# Patient Record
Sex: MALE | Race: WHITE | HISPANIC OR LATINO | Employment: FULL TIME | URBAN - METROPOLITAN AREA
[De-identification: names, ages, dates, MRNs, and addresses within clinical notes are randomized per-mention and may not be internally consistent; named-entity substitution may affect disease eponyms.]

---

## 2018-10-24 ENCOUNTER — HOSPITAL ENCOUNTER (EMERGENCY)
Facility: HOSPITAL | Age: 43
Discharge: HOME | End: 2018-10-24
Attending: EMERGENCY MEDICINE
Payer: COMMERCIAL

## 2018-10-24 VITALS
BODY MASS INDEX: 32.9 KG/M2 | WEIGHT: 235 LBS | OXYGEN SATURATION: 96 % | RESPIRATION RATE: 18 BRPM | HEART RATE: 94 BPM | HEIGHT: 71 IN | SYSTOLIC BLOOD PRESSURE: 135 MMHG | TEMPERATURE: 98 F | DIASTOLIC BLOOD PRESSURE: 64 MMHG

## 2018-10-24 DIAGNOSIS — S86.001A INJURY OF RIGHT ACHILLES TENDON, INITIAL ENCOUNTER: Primary | ICD-10-CM

## 2018-10-24 DIAGNOSIS — M79.673 FOOT PAIN: ICD-10-CM

## 2018-10-24 PROCEDURE — 29515 APPLICATION SHORT LEG SPLINT: CPT | Mod: RT

## 2018-10-24 PROCEDURE — 63600175 PHARM REV CODE 636 W HCPCS: Performed by: NURSE PRACTITIONER

## 2018-10-24 PROCEDURE — 96372 THER/PROPH/DIAG INJ SC/IM: CPT | Mod: 59

## 2018-10-24 PROCEDURE — 99284 EMERGENCY DEPT VISIT MOD MDM: CPT | Mod: 25

## 2018-10-24 RX ORDER — MORPHINE SULFATE 10 MG/ML
10 INJECTION INTRAMUSCULAR; INTRAVENOUS; SUBCUTANEOUS
Status: COMPLETED | OUTPATIENT
Start: 2018-10-24 | End: 2018-10-24

## 2018-10-24 RX ORDER — HYDROCODONE BITARTRATE AND ACETAMINOPHEN 5; 325 MG/1; MG/1
1 TABLET ORAL EVERY 4 HOURS PRN
Qty: 12 TABLET | Refills: 0 | Status: ON HOLD | OUTPATIENT
Start: 2018-10-24 | End: 2018-10-26 | Stop reason: HOSPADM

## 2018-10-24 RX ADMIN — MORPHINE SULFATE 10 MG: 10 INJECTION INTRAVENOUS at 09:10

## 2018-10-25 ENCOUNTER — ANESTHESIA EVENT (OUTPATIENT)
Dept: SURGERY | Facility: HOSPITAL | Age: 43
End: 2018-10-25
Payer: COMMERCIAL

## 2018-10-25 ENCOUNTER — OFFICE VISIT (OUTPATIENT)
Dept: ORTHOPEDICS | Facility: CLINIC | Age: 43
End: 2018-10-25
Payer: COMMERCIAL

## 2018-10-25 VITALS
HEART RATE: 92 BPM | BODY MASS INDEX: 32.9 KG/M2 | HEIGHT: 71 IN | DIASTOLIC BLOOD PRESSURE: 77 MMHG | SYSTOLIC BLOOD PRESSURE: 137 MMHG | WEIGHT: 235 LBS

## 2018-10-25 DIAGNOSIS — M92.61 HAGLUND'S DEFORMITY OF RIGHT HEEL: ICD-10-CM

## 2018-10-25 DIAGNOSIS — S86.011A RUPTURE OF RIGHT ACHILLES TENDON, INITIAL ENCOUNTER: Primary | ICD-10-CM

## 2018-10-25 PROCEDURE — 99999 PR PBB SHADOW E&M-EST. PATIENT-LVL III: CPT | Mod: PBBFAC,,, | Performed by: ORTHOPAEDIC SURGERY

## 2018-10-25 PROCEDURE — 97760 ORTHOTIC MGMT&TRAING 1ST ENC: CPT | Mod: GP,S$GLB,, | Performed by: ORTHOPAEDIC SURGERY

## 2018-10-25 PROCEDURE — 99204 OFFICE O/P NEW MOD 45 MIN: CPT | Mod: 25,S$GLB,, | Performed by: ORTHOPAEDIC SURGERY

## 2018-10-25 RX ORDER — MUPIROCIN 20 MG/G
OINTMENT TOPICAL
Status: CANCELLED | OUTPATIENT
Start: 2018-10-25

## 2018-10-25 RX ORDER — SODIUM CHLORIDE 9 MG/ML
INJECTION, SOLUTION INTRAVENOUS CONTINUOUS
Status: CANCELLED | OUTPATIENT
Start: 2018-10-25

## 2018-10-25 NOTE — ED NOTES
"States that when he was jumping up to shoot basketball he felt something "rip" in his heel, states pain into calf and lateral outer side of right foot. Ice pack applied. Family at bedside aware to notify nurse of needs or concerns  "

## 2018-10-25 NOTE — PROGRESS NOTES
"HPI: Fred Richmond is a 43 y.o. male who complains of right ankle pain. He says the pain began when he was playing basketball yesterday 10/25/18 and he jumped up and then landed and felt a pull in the back of the heel. He rates his pain as 4/10 today. He was seen in the ER and placed in a splint.   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      PAST MEDICAL/SURGICAL/FAMILY/SOCIAL/ HISTORY: REVIEWED    ALLERGIES/MEDICATIONS: REVIEWED       Review of Systems:     Constitution: Negative.   HEENT: Negative.   Eyes: Negative.   Cardiovascular: Negative.   Respiratory: Negative.   Endocrine: Negative.   Hematologic/Lymphatic: Negative.   Skin: Negative.   Musculoskeletal: Positive for right ankle pain   Gastrointestinal: Negative.   Genitourinary: Negative.   Neurological: Negative.   Psychiatric/Behavioral: Negative.   Allergic/Immunologic: Negative.       PHYSICAL EXAM:  Vitals:    10/25/18 1014   BP: 137/77   Pulse: 92     Ht Readings from Last 1 Encounters:   10/25/18 5' 11" (1.803 m)     Wt Readings from Last 1 Encounters:   10/25/18 106.6 kg (235 lb)         GENERAL: Well developed, well nourished, no acute distress.  Very pleasant.   SKIN: Skin is intact. No atrophy, abrasions or lesions are noted.   Neurological: Normal mental status. Appropriate and conversant. Alert and oriented x 3.  GAIT: Walks with crutches.     Right lower extremity compared with LLE:  2+ dorsalis pedis pulse.  Capillary refill < 3 seconds.  Decreased range of motion tibiotalar and subtalar joints due to pain and swelling. Normal alignment of the forefoot and the hindfoot.  5/5 strength EHL, FHL, tibialis anterior, 0/5 gastrocsoleus, 5/5 tibialis posterior and peroneals. Sensation to light touch intact sural, saphenous, superficial peroneal and deep peroneal nerves. moderate swelling and mild ecchymosis of the ankle with palpable defect in the distal achilles tendon which is very tender to palpation.  +Candelaria's test.  No " lymphadenopathy, no masses or tumors palpated.      XRAYS:   3 views of right ankle  reviewed today reveal spurring at the insertion of the achilles as well as Chaz's deformity      ASSESSMENT:        Encounter Diagnoses   Name Primary?    Rupture of right Achilles tendon, initial encounter Yes    Chaz's deformity of right heel         PLAN:  I spent 25 minutes in consulation with the patient today. More than half the time was spent counseling the patient on his condition and the options for operative versus non-operative care.  I recommend Right achilles repair. I highly suspect that this is an insertional rupture therefore I also recommend chaz's excision and removal of bone spurs with possible gastroc recession. I have explained the procedure, including indications, risks, and alternatives.  Fred Richmond gave informed consent and is medically ready for surgery. To OR tomorrow 10/26/18.    We performed a custom orthotic/brace adjustment, fitting and training with the patient today. The patient demonstrated understanding and proper care. This was performed for 15 minutes.  Short boot  was given.  Toe touch weightbearing until surgery.

## 2018-10-25 NOTE — ED PROVIDER NOTES
"Encounter Date: 10/24/2018       History     Chief Complaint   Patient presents with    Foot Injury     right foot pain, was jumping and felt foot pull     Fred Richmond is a 43 year old male presenting to the ED with c/o right foot pain. The patient states that he was playing basketball and went to jump up. When he lifted up onto his toes, he felt a pulling sensation to the right heel area. He had pain to the heel when he landed and felt like "something was underneath my heel and I couldn't put my foot down". He has taken no medication for pain. He denies numbness/tingling to the foot at this time.           Review of patient's allergies indicates:  No Known Allergies  No past medical history on file.  No past surgical history on file.  No family history on file.  Social History     Tobacco Use    Smoking status: Not on file   Substance Use Topics    Alcohol use: Not on file    Drug use: Not on file     Review of Systems   Constitutional: Negative for chills and fever.   HENT: Negative for congestion, rhinorrhea and sore throat.    Eyes: Negative for pain and redness.   Respiratory: Negative for cough and shortness of breath.    Cardiovascular: Negative for chest pain and palpitations.   Gastrointestinal: Negative for abdominal pain, diarrhea and nausea.   Genitourinary: Negative for dysuria, flank pain, frequency, hematuria and urgency.   Musculoskeletal: Positive for arthralgias (right foot/heel). Negative for gait problem, myalgias and neck pain.   Skin: Negative for rash.   Neurological: Negative for dizziness, light-headedness and headaches.       Physical Exam     Initial Vitals [10/24/18 2023]   BP Pulse Resp Temp SpO2   135/64 (!) 111 18 98.2 °F (36.8 °C) 96 %      MAP       --         Physical Exam    Constitutional: He appears well-developed and well-nourished. He is not diaphoretic. He is active. He does not have a sickly appearance. No distress.   HENT:   Head: Normocephalic and atraumatic.   Eyes: " Conjunctivae are normal.   Neck: Normal range of motion and full passive range of motion without pain.   Cardiovascular: Normal rate, regular rhythm and normal heart sounds. Exam reveals no gallop and no friction rub.    No murmur heard.  Pulmonary/Chest: Breath sounds normal. He has no wheezes. He has no rhonchi. He has no rales.   Abdominal: Soft. Bowel sounds are normal. There is no tenderness.   Musculoskeletal: Normal range of motion.        Right ankle: He exhibits normal range of motion and normal pulse. Achilles tendon exhibits pain and abnormal Candelaria's test results.        Right foot: There is tenderness and swelling. There is normal range of motion and normal capillary refill.        Feet:    Neurological: He is alert and oriented to person, place, and time. Gait normal.   Skin: Skin is warm and dry. Capillary refill takes less than 2 seconds.   Psychiatric: He has a normal mood and affect.         ED Course   Splint Application  Date/Time: 10/25/2018 8:44 AM  Performed by: Talisha Kang NP  Authorized by: Kapil Wilks MD   Location details: right leg  Splint type: short leg  Post-procedure: The splinted body part was neurovascularly unchanged following the procedure.  Patient tolerance: Patient tolerated the procedure well with no immediate complications  Comments: Placed by nursing staff.         Labs Reviewed - No data to display       Imaging Results          X-Ray Foot Complete Right (Final result)  Result time 10/25/18 08:04:17    Final result by Tray Baxter Jr., MD (10/25/18 08:04:17)                 Impression:      Soft tissue calcification, soft tissue swelling and and enthesophyte at the Achilles attachment to the calcaneus.  Clinical correlation is recommended.      Electronically signed by: Tray Baxter MD  Date:    10/25/2018  Time:    08:04             Narrative:    EXAMINATION:  XR FOOT COMPLETE 3 VIEW RIGHT    CLINICAL HISTORY:  . Pain in unspecified  foot    TECHNIQUE:  AP, lateral, and oblique views of the right foot were performed.    COMPARISON:  None    FINDINGS:  A fracture of the bones of the right foot is not seen.  There is an enthesophyte at the attachment of the Achilles tendon and a soft tissue calcification more superiorly with soft tissue swelling overlying the Achilles.  Clinical correlation is recommended.                                       APC / Resident Notes:   Fred Richmond is a 43 year old male presenting to the ED with c/o to pain over the Achilles tendon. The patient has an abnormal Candelaria test when compared to the left foot. He had normal ROM of the foot with no fractures noted on xray as interpreted by Dr. Wilks. He has likely injured his Achilles tendon and will require close follow up with orthopedics. He was placed in a posterior short leg splint with the foot in plantar flexion by nursing staff. He was neurovascularly intact following splint placement. Patient advised to avoid bearing weight on foot. Splint care and specific return precautions discussed with patient and he verbalized understanding. Small prescription for Norco provided. Based on my clinical evaluation, I do not appreciate any immediate, emergent, or life threatening condition or etiology that warrants additional workup today and feel that the patient can be discharged with close follow up care.                    Clinical Impression:   The primary encounter diagnosis was Injury of right Achilles tendon, initial encounter. A diagnosis of Foot pain was also pertinent to this visit.      Disposition:   Disposition: Discharged  Condition: Stable                        Talisha Kang NP  10/25/18 0844

## 2018-10-26 ENCOUNTER — TELEPHONE (OUTPATIENT)
Dept: SURGERY | Facility: HOSPITAL | Age: 43
End: 2018-10-26

## 2018-10-26 ENCOUNTER — NURSE TRIAGE (OUTPATIENT)
Dept: ADMINISTRATIVE | Facility: CLINIC | Age: 43
End: 2018-10-26

## 2018-10-26 ENCOUNTER — TELEPHONE (OUTPATIENT)
Dept: ORTHOPEDICS | Facility: CLINIC | Age: 43
End: 2018-10-26

## 2018-10-26 ENCOUNTER — HOSPITAL ENCOUNTER (OUTPATIENT)
Facility: HOSPITAL | Age: 43
Discharge: HOME | End: 2018-10-26
Attending: ORTHOPAEDIC SURGERY | Admitting: ORTHOPAEDIC SURGERY
Payer: COMMERCIAL

## 2018-10-26 ENCOUNTER — ANESTHESIA (OUTPATIENT)
Dept: SURGERY | Facility: HOSPITAL | Age: 43
End: 2018-10-26
Payer: COMMERCIAL

## 2018-10-26 DIAGNOSIS — S86.011A RUPTURE OF RIGHT ACHILLES TENDON, INITIAL ENCOUNTER: Primary | ICD-10-CM

## 2018-10-26 PROCEDURE — 76942 ECHO GUIDE FOR BIOPSY: CPT | Mod: PO | Performed by: ANESTHESIOLOGY

## 2018-10-26 PROCEDURE — 28118 REMOVAL OF HEEL BONE: CPT | Mod: 51,RT,, | Performed by: ORTHOPAEDIC SURGERY

## 2018-10-26 PROCEDURE — 71000039 HC RECOVERY, EACH ADD'L HOUR: Mod: PO | Performed by: ORTHOPAEDIC SURGERY

## 2018-10-26 PROCEDURE — 36000709 HC OR TIME LEV III EA ADD 15 MIN: Mod: PO | Performed by: ORTHOPAEDIC SURGERY

## 2018-10-26 PROCEDURE — 37000008 HC ANESTHESIA 1ST 15 MINUTES: Mod: PO | Performed by: ORTHOPAEDIC SURGERY

## 2018-10-26 PROCEDURE — 63600175 PHARM REV CODE 636 W HCPCS: Mod: PO | Performed by: ANESTHESIOLOGY

## 2018-10-26 PROCEDURE — 71000033 HC RECOVERY, INTIAL HOUR: Mod: PO | Performed by: ORTHOPAEDIC SURGERY

## 2018-10-26 PROCEDURE — 36000708 HC OR TIME LEV III 1ST 15 MIN: Mod: PO | Performed by: ORTHOPAEDIC SURGERY

## 2018-10-26 PROCEDURE — 63600175 PHARM REV CODE 636 W HCPCS: Mod: PO | Performed by: NURSE ANESTHETIST, CERTIFIED REGISTERED

## 2018-10-26 PROCEDURE — 27654 REPAIR OF ACHILLES TENDON: CPT | Mod: RT,,, | Performed by: ORTHOPAEDIC SURGERY

## 2018-10-26 PROCEDURE — 25000003 PHARM REV CODE 250: Mod: PO | Performed by: NURSE ANESTHETIST, CERTIFIED REGISTERED

## 2018-10-26 PROCEDURE — 25000003 PHARM REV CODE 250: Mod: PO | Performed by: ANESTHESIOLOGY

## 2018-10-26 PROCEDURE — D9220A PRA ANESTHESIA: Mod: CRNA,,, | Performed by: NURSE ANESTHETIST, CERTIFIED REGISTERED

## 2018-10-26 PROCEDURE — 37000009 HC ANESTHESIA EA ADD 15 MINS: Mod: PO | Performed by: ORTHOPAEDIC SURGERY

## 2018-10-26 PROCEDURE — 27201423 OPTIME MED/SURG SUP & DEVICES STERILE SUPPLY: Mod: PO | Performed by: ORTHOPAEDIC SURGERY

## 2018-10-26 PROCEDURE — 63600175 PHARM REV CODE 636 W HCPCS: Mod: PO | Performed by: ORTHOPAEDIC SURGERY

## 2018-10-26 PROCEDURE — 25000003 PHARM REV CODE 250: Mod: PO | Performed by: ORTHOPAEDIC SURGERY

## 2018-10-26 PROCEDURE — 76942 ECHO GUIDE FOR BIOPSY: CPT | Mod: 26,,, | Performed by: ANESTHESIOLOGY

## 2018-10-26 PROCEDURE — C1713 ANCHOR/SCREW BN/BN,TIS/BN: HCPCS | Mod: PO | Performed by: ORTHOPAEDIC SURGERY

## 2018-10-26 PROCEDURE — 64445 NJX AA&/STRD SCIATIC NRV IMG: CPT | Mod: 59,RT,, | Performed by: ANESTHESIOLOGY

## 2018-10-26 PROCEDURE — 27685 REVISION OF LOWER LEG TENDON: CPT | Mod: 51,RT,, | Performed by: ORTHOPAEDIC SURGERY

## 2018-10-26 PROCEDURE — 64445 NJX AA&/STRD SCIATIC NRV IMG: CPT | Mod: PO | Performed by: ANESTHESIOLOGY

## 2018-10-26 PROCEDURE — 71000015 HC POSTOP RECOV 1ST HR: Mod: PO | Performed by: ORTHOPAEDIC SURGERY

## 2018-10-26 PROCEDURE — D9220A PRA ANESTHESIA: Mod: ANES,,, | Performed by: ANESTHESIOLOGY

## 2018-10-26 DEVICE — IMPLANTABLE DEVICE: Type: IMPLANTABLE DEVICE | Site: ANKLE | Status: FUNCTIONAL

## 2018-10-26 RX ORDER — LIDOCAINE HYDROCHLORIDE 10 MG/ML
1 INJECTION, SOLUTION EPIDURAL; INFILTRATION; INTRACAUDAL; PERINEURAL ONCE
Status: COMPLETED | OUTPATIENT
Start: 2018-10-26 | End: 2018-10-26

## 2018-10-26 RX ORDER — MUPIROCIN 20 MG/G
OINTMENT TOPICAL
Status: DISCONTINUED | OUTPATIENT
Start: 2018-10-26 | End: 2018-10-26 | Stop reason: HOSPADM

## 2018-10-26 RX ORDER — DEXAMETHASONE SODIUM PHOSPHATE 4 MG/ML
8 INJECTION, SOLUTION INTRA-ARTICULAR; INTRALESIONAL; INTRAMUSCULAR; INTRAVENOUS; SOFT TISSUE
Status: COMPLETED | OUTPATIENT
Start: 2018-10-26 | End: 2018-10-26

## 2018-10-26 RX ORDER — OXYCODONE HYDROCHLORIDE 5 MG/1
5 TABLET ORAL
Status: DISCONTINUED | OUTPATIENT
Start: 2018-10-26 | End: 2018-10-26 | Stop reason: HOSPADM

## 2018-10-26 RX ORDER — MEPERIDINE HYDROCHLORIDE 50 MG/ML
12.5 INJECTION INTRAMUSCULAR; INTRAVENOUS; SUBCUTANEOUS ONCE AS NEEDED
Status: DISCONTINUED | OUTPATIENT
Start: 2018-10-26 | End: 2018-10-26 | Stop reason: HOSPADM

## 2018-10-26 RX ORDER — ONDANSETRON 4 MG/1
8 TABLET, ORALLY DISINTEGRATING ORAL EVERY 8 HOURS PRN
Qty: 20 TABLET | Refills: 0 | Status: SHIPPED | OUTPATIENT
Start: 2018-10-26 | End: 2018-12-13

## 2018-10-26 RX ORDER — FENTANYL CITRATE 50 UG/ML
25 INJECTION, SOLUTION INTRAMUSCULAR; INTRAVENOUS EVERY 5 MIN PRN
Status: DISCONTINUED | OUTPATIENT
Start: 2018-10-26 | End: 2018-10-26 | Stop reason: HOSPADM

## 2018-10-26 RX ORDER — SODIUM CHLORIDE 0.9 % (FLUSH) 0.9 %
3 SYRINGE (ML) INJECTION
Status: DISCONTINUED | OUTPATIENT
Start: 2018-10-26 | End: 2018-10-26 | Stop reason: HOSPADM

## 2018-10-26 RX ORDER — HYDROMORPHONE HYDROCHLORIDE 2 MG/ML
0.2 INJECTION, SOLUTION INTRAMUSCULAR; INTRAVENOUS; SUBCUTANEOUS EVERY 5 MIN PRN
Status: DISCONTINUED | OUTPATIENT
Start: 2018-10-26 | End: 2018-10-26 | Stop reason: HOSPADM

## 2018-10-26 RX ORDER — LIDOCAINE HCL/PF 100 MG/5ML
SYRINGE (ML) INTRAVENOUS
Status: DISCONTINUED | OUTPATIENT
Start: 2018-10-26 | End: 2018-10-26

## 2018-10-26 RX ORDER — PROPOFOL 10 MG/ML
VIAL (ML) INTRAVENOUS
Status: DISCONTINUED | OUTPATIENT
Start: 2018-10-26 | End: 2018-10-26

## 2018-10-26 RX ORDER — ROCURONIUM BROMIDE 10 MG/ML
INJECTION, SOLUTION INTRAVENOUS
Status: DISCONTINUED | OUTPATIENT
Start: 2018-10-26 | End: 2018-10-26

## 2018-10-26 RX ORDER — SODIUM CHLORIDE 9 MG/ML
INJECTION, SOLUTION INTRAVENOUS CONTINUOUS
Status: DISCONTINUED | OUTPATIENT
Start: 2018-10-26 | End: 2018-10-26 | Stop reason: HOSPADM

## 2018-10-26 RX ORDER — CEFAZOLIN SODIUM 2 G/50ML
2 SOLUTION INTRAVENOUS
Status: COMPLETED | OUTPATIENT
Start: 2018-10-26 | End: 2018-10-26

## 2018-10-26 RX ORDER — OXYCODONE AND ACETAMINOPHEN 10; 325 MG/1; MG/1
1 TABLET ORAL EVERY 6 HOURS PRN
Qty: 42 TABLET | Refills: 0 | Status: SHIPPED | OUTPATIENT
Start: 2018-10-26 | End: 2018-12-13

## 2018-10-26 RX ORDER — MIDAZOLAM HYDROCHLORIDE 1 MG/ML
0.5 INJECTION INTRAMUSCULAR; INTRAVENOUS
Status: DISCONTINUED | OUTPATIENT
Start: 2018-10-26 | End: 2018-10-26 | Stop reason: HOSPADM

## 2018-10-26 RX ORDER — ONDANSETRON 2 MG/ML
INJECTION INTRAMUSCULAR; INTRAVENOUS
Status: DISCONTINUED | OUTPATIENT
Start: 2018-10-26 | End: 2018-10-26

## 2018-10-26 RX ORDER — ACETAMINOPHEN 10 MG/ML
INJECTION, SOLUTION INTRAVENOUS
Status: DISCONTINUED | OUTPATIENT
Start: 2018-10-26 | End: 2018-10-26

## 2018-10-26 RX ORDER — MIDAZOLAM HYDROCHLORIDE 1 MG/ML
INJECTION, SOLUTION INTRAMUSCULAR; INTRAVENOUS
Status: DISCONTINUED | OUTPATIENT
Start: 2018-10-26 | End: 2018-10-26

## 2018-10-26 RX ORDER — FENTANYL CITRATE 50 UG/ML
INJECTION, SOLUTION INTRAMUSCULAR; INTRAVENOUS
Status: DISCONTINUED | OUTPATIENT
Start: 2018-10-26 | End: 2018-10-26

## 2018-10-26 RX ORDER — SODIUM CHLORIDE, SODIUM LACTATE, POTASSIUM CHLORIDE, CALCIUM CHLORIDE 600; 310; 30; 20 MG/100ML; MG/100ML; MG/100ML; MG/100ML
INJECTION, SOLUTION INTRAVENOUS CONTINUOUS
Status: DISCONTINUED | OUTPATIENT
Start: 2018-10-26 | End: 2018-10-26 | Stop reason: HOSPADM

## 2018-10-26 RX ADMIN — DEXAMETHASONE SODIUM PHOSPHATE 8 MG: 4 INJECTION, SOLUTION INTRAMUSCULAR; INTRAVENOUS at 06:10

## 2018-10-26 RX ADMIN — OXYCODONE HYDROCHLORIDE 5 MG: 5 TABLET ORAL at 09:10

## 2018-10-26 RX ADMIN — MIDAZOLAM HYDROCHLORIDE 2 MG: 1 INJECTION, SOLUTION INTRAMUSCULAR; INTRAVENOUS at 06:10

## 2018-10-26 RX ADMIN — SODIUM CHLORIDE, SODIUM LACTATE, POTASSIUM CHLORIDE, AND CALCIUM CHLORIDE: .6; .31; .03; .02 INJECTION, SOLUTION INTRAVENOUS at 06:10

## 2018-10-26 RX ADMIN — SODIUM CHLORIDE, SODIUM LACTATE, POTASSIUM CHLORIDE, AND CALCIUM CHLORIDE: .6; .31; .03; .02 INJECTION, SOLUTION INTRAVENOUS at 07:10

## 2018-10-26 RX ADMIN — LIDOCAINE HYDROCHLORIDE 1 MG: 10 INJECTION, SOLUTION EPIDURAL; INFILTRATION; INTRACAUDAL; PERINEURAL at 06:10

## 2018-10-26 RX ADMIN — ROCURONIUM BROMIDE 40 MG: 10 INJECTION, SOLUTION INTRAVENOUS at 07:10

## 2018-10-26 RX ADMIN — MUPIROCIN: 20 OINTMENT TOPICAL at 06:10

## 2018-10-26 RX ADMIN — LIDOCAINE HYDROCHLORIDE 75 MG: 20 INJECTION PARENTERAL at 07:10

## 2018-10-26 RX ADMIN — ACETAMINOPHEN 1000 MG: 10 INJECTION, SOLUTION INTRAVENOUS at 08:10

## 2018-10-26 RX ADMIN — FENTANYL CITRATE 50 MCG: 50 INJECTION INTRAMUSCULAR; INTRAVENOUS at 06:10

## 2018-10-26 RX ADMIN — PROPOFOL 170 MG: 10 INJECTION, EMULSION INTRAVENOUS at 07:10

## 2018-10-26 RX ADMIN — ONDANSETRON 4 MG: 2 INJECTION, SOLUTION INTRAMUSCULAR; INTRAVENOUS at 08:10

## 2018-10-26 RX ADMIN — CEFAZOLIN SODIUM 2 G: 2 SOLUTION INTRAVENOUS at 06:10

## 2018-10-26 RX ADMIN — FENTANYL CITRATE 100 MCG: 50 INJECTION, SOLUTION INTRAMUSCULAR; INTRAVENOUS at 07:10

## 2018-10-26 NOTE — ANESTHESIA PREPROCEDURE EVALUATION
10/26/2018  Fred Richmond is a 43 y.o., male.    Anesthesia Evaluation    I have reviewed the Patient Summary Reports.    I have reviewed the Nursing Notes.   I have reviewed the Medications.     Review of Systems  Anesthesia Hx:  No problems with previous Anesthesia    Social:  Non-Smoker    Cardiovascular:  Cardiovascular Normal     Pulmonary:   Asthma asymptomatic and mild    Renal/:  Renal/ Normal     Neurological:  Neurology Normal    Endocrine:  Endocrine Normal        Physical Exam  General:  Well nourished    Airway/Jaw/Neck:  Airway Findings: Mouth Opening: Normal Tongue: Normal  General Airway Assessment: Adult  Oropharynx Findings:  Mallampati: II  Jaw/Neck Findings:  Neck ROM: Normal ROM     Eyes/Ears/Nose:  Eyes/Ears/Nose Findings:    Dental:  Dental Findings:   Chest/Lungs:  Chest/Lungs Findings: Normal Respiratory Rate     Heart/Vascular:  Heart Findings: Rate: Normal  Rhythm: Regular Rhythm        Mental Status:  Mental Status Findings:  Cooperative, Alert and Oriented         Anesthesia Plan  Type of Anesthesia, risks & benefits discussed:  Anesthesia Type:  general  Patient's Preference:   Intra-op Monitoring Plan: standard ASA monitors  Intra-op Monitoring Plan Comments:   Post Op Pain Control Plan: multimodal analgesia and peripheral nerve block  Post Op Pain Control Plan Comments:   Induction:   IV  Beta Blocker:  Patient is not currently on a Beta-Blocker (No further documentation required).       Informed Consent: Patient understands risks and agrees with Anesthesia plan.  Questions answered. Anesthesia consent signed with patient.  ASA Score: 2     Day of Surgery Review of History & Physical:  There are no significant changes.   H&P completed by Anesthesiologist.       Ready For Surgery From Anesthesia Perspective.

## 2018-10-26 NOTE — DISCHARGE SUMMARY
"[facility]  Discharge Note  Short Stay    Admit Date: 10/26/2018    Discharge Date and Time: 10/26/2018 9:50 AM     Attending Physician: Saurav Yang MD     Discharge Provider: Saurav Yang    Diagnoses:  Active Hospital Problems    Diagnosis  POA    *Achilles rupture, right [S86.011A]  Yes      Resolved Hospital Problems   No resolved problems to display.       Discharged Condition: good    Hospital Course: Patient was admitted for an outpatient procedure and tolerated the procedure well with no complications.    Final Diagnoses: Same as principal problem.    Disposition: Home or Self Care    Follow up/Patient Instructions:    Medications:  Reconciled Home Medications:      Medication List      START taking these medications    ondansetron 4 MG Tbdl  Commonly known as:  ZOFRAN-ODT  Take 2 tablets (8 mg total) by mouth every 8 (eight) hours as needed.     oxyCODONE-acetaminophen  mg per tablet  Commonly known as:  PERCOCET  Take 1 tablet by mouth every 6 (six) hours as needed for Pain.        STOP taking these medications    HYDROcodone-acetaminophen 5-325 mg per tablet  Commonly known as:  NORCO          Discharge Procedure Orders   CRUTCHES FOR HOME USE     Order Specific Question Answer Comments   Type: Axillary    Height: 5' 11" (1.803 m)    Weight: 106.6 kg (235 lb 0.2 oz)    Length of need (1-99 months): 3    Vendor: Other (use comments) [facility]   Expected Date of Delivery: 10/26/2018      Diet general     Call MD for:  temperature >100.4     Call MD for:  persistent nausea and vomiting     Call MD for:  severe uncontrolled pain     Call MD for:  difficulty breathing, headache or visual disturbances     Call MD for:  redness, tenderness, or signs of infection (pain, swelling, redness, odor or green/yellow discharge around incision site)     Call MD for:  hives     Call MD for:  persistent dizziness or light-headedness     Call MD for:  extreme fatigue     Keep surgical extremity " "elevated     No driving, operating heavy equipment or signing legal documents while taking pain medication     Leave dressing on - Keep it clean, dry, and intact until clinic visit     Non weight bearing     Follow-up Information     Saurav Yang MD In 2 weeks.    Specialty:  Orthopedic Surgery  Contact information:  [address] [place] [address]  [phone]                   Discharge Procedure Orders (must include Diet, Follow-up, Activity):   Discharge Procedure Orders (must include Diet, Follow-up, Activity)   CRUTCHES FOR HOME USE     Order Specific Question Answer Comments   Type: Axillary    Height: 5' 11" (1.803 m)    Weight: 106.6 kg (235 lb 0.2 oz)    Length of need (1-99 months): 3    Vendor: Other (use comments) [facility]   Expected Date of Delivery: 10/26/2018      Diet general     Call MD for:  temperature >100.4     Call MD for:  persistent nausea and vomiting     Call MD for:  severe uncontrolled pain     Call MD for:  difficulty breathing, headache or visual disturbances     Call MD for:  redness, tenderness, or signs of infection (pain, swelling, redness, odor or green/yellow discharge around incision site)     Call MD for:  hives     Call MD for:  persistent dizziness or light-headedness     Call MD for:  extreme fatigue     Keep surgical extremity elevated     No driving, operating heavy equipment or signing legal documents while taking pain medication     Leave dressing on - Keep it clean, dry, and intact until clinic visit     Non weight bearing      "

## 2018-10-26 NOTE — TELEPHONE ENCOUNTER
Pt in recovery after surgery and spouse asking about knee roller and states that they talked about it yesterday in clinic. Please followup with patient so he can receive a knee roller. Spouse Valentine can be reached at [phone]. Thanks!

## 2018-10-26 NOTE — H&P
"HPI: Fred Richmond is a 43 y.o. male who complains of right ankle pain. He says the pain began when he was playing basketball yesterday 10/25/18 and he jumped up and then landed and felt a pull in the back of the heel. He rates his pain as 4/10 today. He was seen in the ER and placed in a splint.   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       PAST MEDICAL/SURGICAL/FAMILY/SOCIAL/ HISTORY: REVIEWED    ALLERGIES/MEDICATIONS: REVIEWED         Review of Systems:     Constitution: Negative.   HEENT: Negative.   Eyes: Negative.   Cardiovascular: Negative.   Respiratory: Negative.   Endocrine: Negative.   Hematologic/Lymphatic: Negative.   Skin: Negative.   Musculoskeletal: Positive for right ankle pain   Gastrointestinal: Negative.   Genitourinary: Negative.   Neurological: Negative.   Psychiatric/Behavioral: Negative.   Allergic/Immunologic: Negative.         PHYSICAL EXAM:      Vitals:     10/25/18 1014   BP: 137/77   Pulse: 92          Ht Readings from Last 1 Encounters:   10/25/18 5' 11" (1.803 m)          Wt Readings from Last 1 Encounters:   10/25/18 106.6 kg (235 lb)            GENERAL: Well developed, well nourished, no acute distress.  Very pleasant.   SKIN: Skin is intact. No atrophy, abrasions or lesions are noted.   Neurological: Normal mental status. Appropriate and conversant. Alert and oriented x 3.  GAIT: Walks with crutches.      Right lower extremity compared with LLE:  2+ dorsalis pedis pulse.  Capillary refill < 3 seconds.  Decreased range of motion tibiotalar and subtalar joints due to pain and swelling. Normal alignment of the forefoot and the hindfoot.  5/5 strength EHL, FHL, tibialis anterior, 0/5 gastrocsoleus, 5/5 tibialis posterior and peroneals. Sensation to light touch intact sural, saphenous, superficial peroneal and deep peroneal nerves. moderate swelling and mild ecchymosis of the ankle with palpable defect in the distal achilles tendon which is very tender to " palpation.  +Candelaria's test.  No lymphadenopathy, no masses or tumors palpated.       XRAYS:   3 views of right ankle  reviewed today reveal spurring at the insertion of the achilles as well as Chaz's deformity        ASSESSMENT:           Encounter Diagnoses   Name Primary?    Rupture of right Achilles tendon, initial encounter Yes    Chaz's deformity of right heel          PLAN:  I spent 25 minutes in consulation with the patient today. More than half the time was spent counseling the patient on his condition and the options for operative versus non-operative care.  I recommend Right achilles repair. I highly suspect that this is an insertional rupture therefore I also recommend chaz's excision and removal of bone spurs with possible gastroc recession. I have explained the procedure, including indications, risks, and alternatives.  Fred Richmond gave informed consent and is medically ready for surgery. To OR tomorrow 10/26/18.    We performed a custom orthotic/brace adjustment, fitting and training with the patient today. The patient demonstrated understanding and proper care. This was performed for 15 minutes.  Short boot  was given.  Toe touch weightbearing until surgery.

## 2018-10-26 NOTE — TRANSFER OF CARE
"Anesthesia Transfer of Care Note    Patient: Fred Richmond    Procedure(s) Performed: Procedure(s) (LRB):  REPAIR, TENDON, ACHILLES (Right)  EXCISION, EXOSTOSIS, RETROCALCANEAL (Right)    Patient location: PACU    Anesthesia Type: general    Transport from OR: Transported from OR on room air with adequate spontaneous ventilation    Post pain: adequate analgesia    Post assessment: no apparent anesthetic complications and tolerated procedure well    Post vital signs: stable    Level of consciousness: awake and alert    Nausea/Vomiting: no nausea/vomiting    Complications: none    Transfer of care protocol was followed      Last vitals:   Visit Vitals  BP (!) 145/64 (BP Location: Left arm, Patient Position: Lying)   Pulse 88   Temp 36.4 °C (97.5 °F)   Resp 12   Ht 5' 11" (1.803 m)   Wt 106.6 kg (235 lb 0.2 oz)   SpO2 (!) 90%   BMI 32.78 kg/m²     "

## 2018-10-26 NOTE — ANESTHESIA POSTPROCEDURE EVALUATION
"Anesthesia Post Evaluation    Patient: Fred Richmond    Procedure(s) Performed: Procedure(s) (LRB):  REPAIR, TENDON, ACHILLES (Right)  EXCISION, EXOSTOSIS, RETROCALCANEAL (Right)    Final Anesthesia Type: general  Patient location during evaluation: PACU  Patient participation: Yes- Able to Participate  Level of consciousness: awake and alert and oriented  Post-procedure vital signs: reviewed and stable  Pain management: adequate  Airway patency: patent  PONV status at discharge: No PONV  Anesthetic complications: no      Cardiovascular status: blood pressure returned to baseline and stable  Respiratory status: unassisted and spontaneous ventilation  Hydration status: euvolemic  Follow-up not needed.        Visit Vitals  BP (!) 145/64 (BP Location: Left arm, Patient Position: Lying)   Pulse 88   Temp 36.4 °C (97.5 °F)   Resp 12   Ht 5' 11" (1.803 m)   Wt 106.6 kg (235 lb 0.2 oz)   SpO2 (!) 94%   BMI 32.78 kg/m²       Pain/Kevin Score: Pain Assessment Performed: Yes (10/26/2018  9:05 AM)  Presence of Pain: non-verbal indicators absent (10/26/2018  9:05 AM)  Pain Rating Prior to Med Admin: 3 (10/26/2018  6:40 AM)  Kevin Score: 5 (10/26/2018  9:05 AM)        "

## 2018-10-26 NOTE — PLAN OF CARE
07:10AM-PATIENT'S RING REMOVED IN THE OR PRIOR TO POSITIONING THE PATIENT. RING PLACED INTO PLASTIC BAG AND GIVEN TO RECOVERY RN FOLLOWING THE PROCEDURE.

## 2018-10-26 NOTE — TELEPHONE ENCOUNTER
Spoke to patient's wife. Advised that we are waiting on insurance approval for the knee scooter. Wife stated understanding.

## 2018-10-26 NOTE — OP NOTE
OPERATIVE NOTE    DATE : 10/26/2018 TIME: 9:40 AM     PATIENT NAME: Fred Richmond    PREOPERATIVE DIAGNOSIS: 1) Right Achilles rupture 2) Right Chaz's deformity 3) Right equinus contracture      POSTOPERATIVE DIAGNOSIS: 1) Right Achilles rupture 2) Right Chaz's deformity 3) Right equinus contracture    PROCEDURES:    1.  Right Achilles debridement with reinsertion of the tendon, cpt code 40462  2.  Achilles tendon lengthening  3.  Chaz's excision, cpt code 37403    SURGEON: Saurav Yang MD      ASSISTANT: Brisa Valverde, Surgical first assistant student    ANESTHESIA: General endotracheal anesthesia    EBL: Less than 10 mL.    COMPLICATIONS: None    SPECIMENS SENT: None.     PROCEDURE IN DETAIL:  After appropriate informed consent was obtained, the patient was taken to the OR, placed in prone position on the operating room table. The Right lower extremity was prepped, draped in usual sterile fashion. Tourniquet was raised to 300 mmHg after Esmarch exsanguination of the limb. An approximately 15-cm incision was made in line with the medial aspect of the os calcis curving proximally over the midline of the Achilles tendon. This was done using a #15 blade through the skin and subcutaneous tissue. Full-thickness skin flaps were raised with peritenon as one flap. Skin retractors were placed and Achilles tendon insertion was cleared of soft tissue and sharply dissected from its insertion using a #15 blade.  There was a complete rupture of the achilles tendon at its insertion distally. There was a large spur and Chaz's deformity at the posterior aspect of the os calcis on the calcaneus. Tendon was palpated distally and any devitalized tissue and the tendon was debrided to healthy looking tissue. There was also a 1 cm calcification within the tendon that was sharply excised.  Allis clamp was placed on the end of the Achilles tendon and V-Y advancement lengthening of the Achilles was performed using a #15 blade  through the fascia, also releasing the raphe within the lateral portion of achilles muscle belly. The tendon was wrapped with a moist lap and retracted proximally.     Sagittal saw was used to resect the Chaz's deformity and spurring at the base of the insertion of the Achilles tendon.  Four Mert peak suture anchors with fiber tape were used for to reattach the Achilles tendon using a double-row type repair. Four drill holes were made in the calcaneus posteriorly.  Two proximal 2.9-mm suture anchors were placed first proximally.  The fiber tape sutures were passed through the Achilles tendon and then, the two additional 2.9 mm anchors were placed in the calcaneus in line with the first two. One suture from the medial side and one suture from the lateral side were tamped down in the second row. This was done for both anchors and the result was a good repair with very good tendon apposition to the bone surface. Sutures were cut at the level of bone. The incisions were irrigated with normal saline.  The deep layer of the incision was re-approximated using 0 Monocryl in interrupted sutures, subcutaneous layer was re-approximated using 2.0 Monocryl in interrupted sutures, skin was re-approximated in the distal portion of the incision using 3.0 nylon in interrupted locking horizontal mattress suture, and the remainder of the incisions were re-approximated using 3.0 nylon in a running sutures. Sterile dressing using Xeroform, bacitracin, 4 x 8's, ABD, cast padding, posterior splint with the foot in 20 degrees planar flexion was placed. Patient tolerated the procedure well without complications. I was present and scrubbed for the entire case.

## 2018-10-26 NOTE — PLAN OF CARE
Patient tolerating oral liquids without difficulty. No apparent s&s of distress noted at this time. States pain 0/10 to right foot. Dressing C,D,I with splint in place. Pt owns crutches and message sent to Dr. Minor nurse ab knee roller.  Injection site free from redness and drainage.  Discharge instructions reviewed with patient/family/friend with good verbal feedback received. Patient ready for discharge

## 2018-10-26 NOTE — TELEPHONE ENCOUNTER
Spoke to patient's wife. Wife states that patient is currently sleeping at this time. He was very anxious before going to sleep. Wife will take to ER if symptoms worsen.

## 2018-10-26 NOTE — ANESTHESIA PROCEDURE NOTES
Right Popliteal Nerve Block    Patient location during procedure: pre-op   Block not for primary anesthetic.  Reason for block: at surgeon's request and post-op pain management   Post-op Pain Location: Right Achilles Tendon pain  Start time: 10/26/2018 6:39 AM  Timeout: 10/26/2018 6:39 AM   End time: 10/26/2018 6:44 AM  Surgery related to: Right Achilles Tendon repair  Staffing  Anesthesiologist: Tray Jones MD  Performed: anesthesiologist   Preanesthetic Checklist  Completed: patient identified, site marked, surgical consent, pre-op evaluation, timeout performed, IV checked, risks and benefits discussed and monitors and equipment checked  Peripheral Block  Patient position: left lateral decubitus  Prep: ChloraPrep  Patient monitoring: heart rate, cardiac monitor, continuous pulse ox, continuous capnometry and frequent blood pressure checks  Block type: popliteal  Laterality: right  Injection technique: single shot  Needle  Needle type: Stimuplex   Needle gauge: 20 G  Needle length: 4 in  Needle localization: anatomical landmarks and ultrasound guidance  Needle insertion depth: 5 cm   -ultrasound image captured on disc.  Assessment  Injection assessment: negative aspiration, negative parasthesia and local visualized surrounding nerve  Paresthesia pain: none  Heart rate change: no  Slow fractionated injection: yes  Additional Notes  Great view of nerves and bifurcation and Pt blocked superior to bifurcation with great spread of local around entire nerve.  Exparel 20ml + Bupivicaine 0.5% 10ml

## 2018-10-26 NOTE — TELEPHONE ENCOUNTER
----- Message from Irina Gregg sent at 10/26/2018  3:31 PM CDT -----  Contact: Wife Valentine  Sent over to ON Call Nurse -Patients wife states that her  is having some post op anxiety, states he feels like he can't breath or sit still.  Please call Valentine back to advise call at [phone].  Thanks

## 2018-10-26 NOTE — DISCHARGE INSTRUCTIONS
FOOT SURGERY  After surgery:    DOS:   Keep leg elevated until first post operative visit   Keep dressing clean and dry DO NOT CHANGE BANDAGES   Advance diet as tolerated.    Check circulation frequently in toes by pressing down on toenail. Nail should turn white and then pink WITHIN 3 SECONDS when released.   No weight bearing to right foot.   Resume home medications   No NSAIDS    DONT:   Do not remove your dressing   Do not get dressing wet.   No driving until released by MD   DO NOT TAKE ADDITIONAL TYLENOL/ACETAMINOPHEN WHILE TAKING NARCOTIC PAIN MEDICATION THAT CONTAINS TYLENOL/ACETAMINOPHEN.    CALL PHYSICIAN FOR:   Burning, or numbness of the toes not relieved by elevation of the leg.   Pale or cold toes; bluish nail beds.   Redness, swelling, or bleeding.   Fever> 101   Drainage (pus) from the operative sites   Pain unrelieved by pain medication    FOR EMERGENCIES CONTACT YOUR PHYSICIAN'S OFFICE      Discharge Instructions: After Your Surgery  Youve just had surgery. During surgery, you were given medicine called anesthesia to keep you relaxed and free of pain. After surgery, you may have some pain or nausea. This is common. Here are some tips for feeling better and getting well after surgery.     Stay on schedule with your medicine.   Going home  Your healthcare provider will show you how to take care of yourself when you go home. He or she will also answer your questions. Have an adult family member or friend drive you home. For the first 24 hours after your surgery:  · Do not drive or use heavy equipment.  · Do not make important decisions or sign legal papers.  · Do not drink alcohol.  · Have someone stay with you, if needed. He or she can watch for problems and help keep you safe.  Be sure to go to all follow-up visits with your healthcare provider. And rest after your surgery for as long as your healthcare provider tells you to.  Coping with pain  If you have pain after surgery,  pain medicine will help you feel better. Take it as told, before pain becomes severe. Also, ask your healthcare provider or pharmacist about other ways to control pain. This might be with heat, ice, or relaxation. And follow any other instructions your surgeon or nurse gives you.  Tips for taking pain medicine  To get the best relief possible, remember these points:  · Pain medicines can upset your stomach. Taking them with a little food may help.  · Most pain relievers taken by mouth need at least 20 to 30 minutes to start to work.  · Taking medicine on a schedule can help you remember to take it. Try to time your medicine so that you can take it before starting an activity. This might be before you get dressed, go for a walk, or sit down for dinner.  · Constipation is a common side effect of pain medicines. Call your healthcare provider before taking any medicines such as laxatives or stool softeners to help ease constipation. Also ask if you should skip any foods. Drinking lots of fluids and eating foods such as fruits and vegetables that are high in fiber can also help. Remember, do not take laxatives unless your surgeon has prescribed them.  · Drinking alcohol and taking pain medicine can cause dizziness and slow your breathing. It can even be deadly. Do not drink alcohol while taking pain medicine.  · Pain medicine can make you react more slowly to things. Do not drive or run machinery while taking pain medicine.  Your healthcare provider may tell you to take acetaminophen to help ease your pain. Ask him or her how much you are supposed to take each day. Acetaminophen or other pain relievers may interact with your prescription medicines or other over-the-counter (OTC) medicines. Some prescription medicines have acetaminophen and other ingredients. Using both prescription and OTC acetaminophen for pain can cause you to overdose. Read the labels on your OTC medicines with care. This will help you to clearly know  the list of ingredients, how much to take, and any warnings. It may also help you not take too much acetaminophen. If you have questions or do not understand the information, ask your pharmacist or healthcare provider to explain it to you before you take the OTC medicine.  Managing nausea  Some people have an upset stomach after surgery. This is often because of anesthesia, pain, or pain medicine, or the stress of surgery. These tips will help you handle nausea and eat healthy foods as you get better. If you were on a special food plan before surgery, ask your healthcare provider if you should follow it while you get better. These tips may help:  · Do not push yourself to eat. Your body will tell you when to eat and how much.  · Start off with clear liquids and soup. They are easier to digest.  · Next try semi-solid foods, such as mashed potatoes, applesauce, and gelatin, as you feel ready.  · Slowly move to solid foods. Dont eat fatty, rich, or spicy foods at first.  · Do not force yourself to have 3 large meals a day. Instead eat smaller amounts more often.  · Take pain medicines with a small amount of solid food, such as crackers or toast, to avoid nausea.     Call your surgeon if  · You still have pain an hour after taking medicine. The medicine may not be strong enough.  · You feel too sleepy, dizzy, or groggy. The medicine may be too strong.  · You have side effects like nausea, vomiting, or skin changes, such as rash, itching, or hives.       If you have obstructive sleep apnea  You were given anesthesia medicine during surgery to keep you comfortable and free of pain. After surgery, you may have more apnea spells because of this medicine and other medicines you were given. The spells may last longer than usual.   At home:  · Keep using the continuous positive airway pressure (CPAP) device when you sleep. Unless your healthcare provider tells you not to, use it when you sleep, day or night. CPAP is a common  device used to treat obstructive sleep apnea.  · Talk with your provider before taking any pain medicine, muscle relaxants, or sedatives. Your provider will tell you about the possible dangers of taking these medicines.  Date Last Reviewed: 12/1/2016 © 2000-2017 [employer]. [address] [place] [address]. All rights reserved. This information is not intended as a substitute for professional medical care. Always follow your healthcare professional's instructions.    Exparel(bupivacaine) has been injected to provide approximately 72 hours of reduced pain after your surgery.  Do not remove the bracelet for five days  Report to your doctor as soon as possible the following:   Restlessness   Anxiety   Speech problems,    Lightheadedness   Numbness and tingling of the mouth and lips   Seizures    Metallic taste   Blurred vision   Tremors    Twitching   Depression   Extreme drowsiness  Avoid additional use of local anesthetics (such as dental procedures) for five days (96 hours)

## 2018-10-26 NOTE — BRIEF OP NOTE
OPERATIVE NOTE    DATE : 10/26/2018 TIME: 9:40 AM     PATIENT NAME: Fred Richmond    PREOPERATIVE DIAGNOSIS: 1) Right Achilles rupture 2) Right Chaz's deformity 3) Right equinus contracture      POSTOPERATIVE DIAGNOSIS: 1) Right Achilles rupture 2) Right Chaz's deformity 3) Right equinus contracture    PROCEDURES:    1.  Right Achilles debridement with reinsertion of the tendon, cpt code 62453  2.  Achilles tendon lengthening  3.  Chaz's excision, cpt code 82123    SURGEON: Saurav Yang MD    ANESTHESIA: General endotracheal anesthesia    EBL: Less than 10 mL.    COMPLICATIONS: None    SPECIMENS SENT: None.

## 2018-10-26 NOTE — TELEPHONE ENCOUNTER
Reason for Disposition   Nursing judgment    Protocols used: ST NO PROTOCOL CALL: SICK ADULT-A-[facility]    Spoke to Mrs. Richmond patient's wife. She states that Mr. Richmond had surgery today. Patient is experiencing anxiety and crying. She is unsure if he is reacting to anesthesia. Mr. Richmond is requesting to speak with Dr. Yang regarding patient's anxiety. Caller advised to bring patient to the ED if condition worsens.

## 2018-10-29 VITALS
RESPIRATION RATE: 12 BRPM | DIASTOLIC BLOOD PRESSURE: 73 MMHG | BODY MASS INDEX: 32.9 KG/M2 | HEIGHT: 71 IN | OXYGEN SATURATION: 98 % | TEMPERATURE: 98 F | SYSTOLIC BLOOD PRESSURE: 161 MMHG | HEART RATE: 80 BPM | WEIGHT: 235 LBS

## 2018-11-09 ENCOUNTER — OFFICE VISIT (OUTPATIENT)
Dept: ORTHOPEDICS | Facility: CLINIC | Age: 43
End: 2018-11-09
Payer: COMMERCIAL

## 2018-11-09 VITALS
DIASTOLIC BLOOD PRESSURE: 86 MMHG | BODY MASS INDEX: 32.9 KG/M2 | WEIGHT: 235 LBS | SYSTOLIC BLOOD PRESSURE: 140 MMHG | HEIGHT: 71 IN | HEART RATE: 76 BPM

## 2018-11-09 DIAGNOSIS — M92.61 HAGLUND'S DEFORMITY OF RIGHT HEEL: Primary | ICD-10-CM

## 2018-11-09 DIAGNOSIS — S86.011D RUPTURE OF RIGHT ACHILLES TENDON, SUBSEQUENT ENCOUNTER: ICD-10-CM

## 2018-11-09 PROCEDURE — 29405 APPL SHORT LEG CAST: CPT | Mod: 58,RT,S$GLB, | Performed by: ORTHOPAEDIC SURGERY

## 2018-11-09 PROCEDURE — 99024 POSTOP FOLLOW-UP VISIT: CPT | Mod: S$GLB,,, | Performed by: ORTHOPAEDIC SURGERY

## 2018-11-09 PROCEDURE — 99999 PR PBB SHADOW E&M-EST. PATIENT-LVL III: CPT | Mod: PBBFAC,,, | Performed by: ORTHOPAEDIC SURGERY

## 2018-11-09 NOTE — PROGRESS NOTES
Subjective:      Patient ID: Fred Richmond is a 43 y.o. male.    Chief Complaint: Post-op Evaluation of the Right Ankle (Achilles debridement with reinsertion of tendon haglands deformity right equinus contracture DOS: 10-26-18)    Doing well today. He rates his pain as 2/10 today. He is no longer taking pain meds.   Social History     Occupational History    Not on file   Tobacco Use    Smoking status: Never Smoker    Smokeless tobacco: Never Used   Substance and Sexual Activity    Alcohol use: Yes     Comment: rarely    Drug use: No    Sexual activity: Not on file            Objective:    Ortho Exam     RLE: Neurovascularly intact, incisions well healed, moderate swelling, sutures are intact.  No signs of infection.    Assessment:       1. Chaz's deformity of right heel    2. Rupture of right Achilles tendon, subsequent encounter          Plan:       Short leg cast applied. Distal sutures removed today. Non-weightbearing. F/u 2 weeks, no xray.

## 2018-11-29 ENCOUNTER — OFFICE VISIT (OUTPATIENT)
Dept: ORTHOPEDICS | Facility: CLINIC | Age: 43
End: 2018-11-29
Payer: COMMERCIAL

## 2018-11-29 VITALS — HEIGHT: 71 IN | WEIGHT: 235 LBS | BODY MASS INDEX: 32.9 KG/M2

## 2018-11-29 DIAGNOSIS — S86.011D RUPTURE OF RIGHT ACHILLES TENDON, SUBSEQUENT ENCOUNTER: ICD-10-CM

## 2018-11-29 DIAGNOSIS — M92.61 HAGLUND'S DEFORMITY OF RIGHT HEEL: Primary | ICD-10-CM

## 2018-11-29 PROCEDURE — 29405 APPL SHORT LEG CAST: CPT | Mod: 58,RT,S$GLB, | Performed by: ORTHOPAEDIC SURGERY

## 2018-11-29 PROCEDURE — 99999 PR PBB SHADOW E&M-EST. PATIENT-LVL II: CPT | Mod: PBBFAC,,, | Performed by: ORTHOPAEDIC SURGERY

## 2018-11-29 PROCEDURE — 99024 POSTOP FOLLOW-UP VISIT: CPT | Mod: S$GLB,,, | Performed by: ORTHOPAEDIC SURGERY

## 2018-12-04 NOTE — PROGRESS NOTES
Subjective:      Patient ID: Fred Richmond is a 43 y.o. male.    Chief Complaint: Post-op Evaluation of the Right Ankle and Post-op Evaluation (s/p achilles repair; josué 10/24/18)    Doing very well today. He rates his pain as 0/10 today.  Social History     Occupational History    Not on file   Tobacco Use    Smoking status: Never Smoker    Smokeless tobacco: Never Used   Substance and Sexual Activity    Alcohol use: Yes     Comment: rarely    Drug use: No    Sexual activity: Not on file            Objective:    Ortho Exam     RLE: Neurovascularly intact, incisions well healed, mild swelling. No signs of infection.    Assessment:       1. Chaz's deformity of right heel    2. Rupture of right Achilles tendon, subsequent encounter          Plan:       Short leg cast applied. Distal sutures removed today. Non-weightbearing. F/u 2 weeks, no xray.

## 2018-12-13 ENCOUNTER — OFFICE VISIT (OUTPATIENT)
Dept: ORTHOPEDICS | Facility: CLINIC | Age: 43
End: 2018-12-13
Payer: COMMERCIAL

## 2018-12-13 VITALS
SYSTOLIC BLOOD PRESSURE: 131 MMHG | WEIGHT: 235 LBS | DIASTOLIC BLOOD PRESSURE: 84 MMHG | HEIGHT: 71 IN | HEART RATE: 87 BPM | BODY MASS INDEX: 32.9 KG/M2

## 2018-12-13 DIAGNOSIS — M92.61 HAGLUND'S DEFORMITY OF RIGHT HEEL: Primary | ICD-10-CM

## 2018-12-13 DIAGNOSIS — S86.011D RUPTURE OF RIGHT ACHILLES TENDON, SUBSEQUENT ENCOUNTER: ICD-10-CM

## 2018-12-13 PROCEDURE — 99024 POSTOP FOLLOW-UP VISIT: CPT | Mod: S$GLB,,, | Performed by: ORTHOPAEDIC SURGERY

## 2018-12-13 PROCEDURE — 99999 PR PBB SHADOW E&M-EST. PATIENT-LVL III: CPT | Mod: PBBFAC,,, | Performed by: ORTHOPAEDIC SURGERY

## 2018-12-13 NOTE — PROGRESS NOTES
Subjective:      Patient ID: Fred Richmond is a 43 y.o. male.    Chief Complaint: Post-op Evaluation of the Right Ankle and Post-op Evaluation (s/p achilles repair; Violet 10/24/18)    Doing very well today. He rates his pain as 0/10 today.  Social History     Occupational History    Not on file   Tobacco Use    Smoking status: Never Smoker    Smokeless tobacco: Never Used   Substance and Sexual Activity    Alcohol use: Yes     Comment: rarely    Drug use: No    Sexual activity: Not on file            Objective:    Ortho Exam     RLE: Neurovascularly intact, incisions well healed, mild swelling. No signs of infection. Decreased range of motion of the ankle.     Assessment:       1. Chaz's deformity of right heel    2. Rupture of right Achilles tendon, subsequent encounter          Plan:       Weight bearing as tolerated in short boot. PT 2/6.  F/u 2 weeks, no xray.

## 2018-12-27 ENCOUNTER — OFFICE VISIT (OUTPATIENT)
Dept: ORTHOPEDICS | Facility: CLINIC | Age: 43
End: 2018-12-27
Payer: COMMERCIAL

## 2018-12-27 VITALS
DIASTOLIC BLOOD PRESSURE: 98 MMHG | WEIGHT: 235 LBS | HEIGHT: 71 IN | HEART RATE: 92 BPM | SYSTOLIC BLOOD PRESSURE: 146 MMHG | BODY MASS INDEX: 32.9 KG/M2

## 2018-12-27 DIAGNOSIS — M92.61 HAGLUND'S DEFORMITY OF RIGHT HEEL: ICD-10-CM

## 2018-12-27 DIAGNOSIS — S86.011D RUPTURE OF RIGHT ACHILLES TENDON, SUBSEQUENT ENCOUNTER: Primary | ICD-10-CM

## 2018-12-27 PROCEDURE — 99999 PR PBB SHADOW E&M-EST. PATIENT-LVL III: CPT | Mod: PBBFAC,,, | Performed by: ORTHOPAEDIC SURGERY

## 2018-12-27 PROCEDURE — 99024 POSTOP FOLLOW-UP VISIT: CPT | Mod: S$GLB,,, | Performed by: ORTHOPAEDIC SURGERY

## 2019-01-31 ENCOUNTER — OFFICE VISIT (OUTPATIENT)
Dept: ORTHOPEDICS | Facility: CLINIC | Age: 44
End: 2019-01-31
Payer: COMMERCIAL

## 2019-01-31 VITALS
DIASTOLIC BLOOD PRESSURE: 82 MMHG | SYSTOLIC BLOOD PRESSURE: 124 MMHG | HEIGHT: 71 IN | HEART RATE: 90 BPM | WEIGHT: 235 LBS | BODY MASS INDEX: 32.9 KG/M2

## 2019-01-31 DIAGNOSIS — S86.011D RUPTURE OF RIGHT ACHILLES TENDON, SUBSEQUENT ENCOUNTER: ICD-10-CM

## 2019-01-31 DIAGNOSIS — M92.61 HAGLUND'S DEFORMITY OF RIGHT HEEL: Primary | ICD-10-CM

## 2019-01-31 PROCEDURE — 99024 POSTOP FOLLOW-UP VISIT: CPT | Mod: S$GLB,,, | Performed by: ORTHOPAEDIC SURGERY

## 2019-01-31 PROCEDURE — 99024 PR POST-OP FOLLOW-UP VISIT: ICD-10-PCS | Mod: S$GLB,,, | Performed by: ORTHOPAEDIC SURGERY

## 2019-01-31 PROCEDURE — 99999 PR PBB SHADOW E&M-EST. PATIENT-LVL III: CPT | Mod: PBBFAC,,, | Performed by: ORTHOPAEDIC SURGERY

## 2019-01-31 PROCEDURE — 99999 PR PBB SHADOW E&M-EST. PATIENT-LVL III: ICD-10-PCS | Mod: PBBFAC,,, | Performed by: ORTHOPAEDIC SURGERY

## 2019-01-31 NOTE — PROGRESS NOTES
Subjective:      Patient ID: Fred Richmond is a 43 y.o. male.    Chief Complaint: Post-op Evaluation of the Right Ankle and Post-op Evaluation (s/p achilles repair; chaz's 10/24/18)    Doing y well today. He rates his pain as 3/10 today. He is doing well with PT. He says he was wearing a tennis shoed but put the boot back on because is was swelling.   Social History     Occupational History    Not on file   Tobacco Use    Smoking status: Never Smoker    Smokeless tobacco: Never Used   Substance and Sexual Activity    Alcohol use: Yes     Comment: rarely    Drug use: No    Sexual activity: Not on file            Objective:    Ortho Exam     RLE: Neurovascularly intact, incisions well healed, mild dependent edema. No signs of infection. Near full range of motion of the ankle but improved since last visit. 5/5  plantar flexion strength.     Assessment:       1. Chaz's deformity of right heel    2. Rupture of right Achilles tendon, subsequent encounter          Plan:       Weight bearing as tolerated in tennis shoe. I gave him and prescription for compression stockings again. Continue  PT for strengthening 2/6.  Gradual return to exercise. F/u  prn.

## 2019-08-21 ENCOUNTER — HOSPITAL ENCOUNTER (EMERGENCY)
Facility: HOSPITAL | Age: 44
Discharge: HOME | End: 2019-08-21
Attending: EMERGENCY MEDICINE
Payer: COMMERCIAL

## 2019-08-21 VITALS
WEIGHT: 245 LBS | RESPIRATION RATE: 14 BRPM | BODY MASS INDEX: 33.18 KG/M2 | TEMPERATURE: 98 F | DIASTOLIC BLOOD PRESSURE: 76 MMHG | OXYGEN SATURATION: 95 % | HEART RATE: 84 BPM | HEIGHT: 72 IN | SYSTOLIC BLOOD PRESSURE: 129 MMHG

## 2019-08-21 DIAGNOSIS — R07.9 CHEST PAIN: ICD-10-CM

## 2019-08-21 DIAGNOSIS — F41.9 ANXIETY: Primary | ICD-10-CM

## 2019-08-21 LAB
ALBUMIN SERPL BCP-MCNC: 4.2 G/DL (ref 3.5–5.2)
ALP SERPL-CCNC: 39 U/L (ref 55–135)
ALT SERPL W/O P-5'-P-CCNC: 39 U/L (ref 10–44)
ANION GAP SERPL CALC-SCNC: 8 MMOL/L (ref 8–16)
AST SERPL-CCNC: 46 U/L (ref 10–40)
BASOPHILS # BLD AUTO: 0.11 K/UL (ref 0–0.2)
BASOPHILS NFR BLD: 1.4 % (ref 0–1.9)
BILIRUB SERPL-MCNC: 0.6 MG/DL (ref 0.1–1)
BNP SERPL-MCNC: 14 PG/ML (ref 0–99)
BUN SERPL-MCNC: 23 MG/DL (ref 6–20)
CALCIUM SERPL-MCNC: 9.3 MG/DL (ref 8.7–10.5)
CHLORIDE SERPL-SCNC: 106 MMOL/L (ref 95–110)
CO2 SERPL-SCNC: 26 MMOL/L (ref 23–29)
CREAT SERPL-MCNC: 1.1 MG/DL (ref 0.5–1.4)
D DIMER PPP IA.FEU-MCNC: <0.27 UG/ML FEU
DIFFERENTIAL METHOD: ABNORMAL
EOSINOPHIL # BLD AUTO: 0.2 K/UL (ref 0–0.5)
EOSINOPHIL NFR BLD: 2 % (ref 0–8)
ERYTHROCYTE [DISTWIDTH] IN BLOOD BY AUTOMATED COUNT: 13.4 % (ref 11.5–14.5)
EST. GFR  (AFRICAN AMERICAN): >60 ML/MIN/1.73 M^2
EST. GFR  (NON AFRICAN AMERICAN): >60 ML/MIN/1.73 M^2
GLUCOSE SERPL-MCNC: 81 MG/DL (ref 70–110)
HCT VFR BLD AUTO: 47.6 % (ref 40–54)
HGB BLD-MCNC: 15.3 G/DL (ref 14–18)
IMM GRANULOCYTES # BLD AUTO: 0.02 K/UL (ref 0–0.04)
IMM GRANULOCYTES NFR BLD AUTO: 0.3 % (ref 0–0.5)
LYMPHOCYTES # BLD AUTO: 2.2 K/UL (ref 1–4.8)
LYMPHOCYTES NFR BLD: 27.6 % (ref 18–48)
MCH RBC QN AUTO: 28.5 PG (ref 27–31)
MCHC RBC AUTO-ENTMCNC: 32.1 G/DL (ref 32–36)
MCV RBC AUTO: 89 FL (ref 82–98)
MONOCYTES # BLD AUTO: 0.8 K/UL (ref 0.3–1)
MONOCYTES NFR BLD: 9.8 % (ref 4–15)
NEUTROPHILS # BLD AUTO: 4.6 K/UL (ref 1.8–7.7)
NEUTROPHILS NFR BLD: 58.9 % (ref 38–73)
NRBC BLD-RTO: 0 /100 WBC
PLATELET # BLD AUTO: 387 K/UL (ref 150–350)
PMV BLD AUTO: 10.2 FL (ref 9.2–12.9)
POTASSIUM SERPL-SCNC: 3.9 MMOL/L (ref 3.5–5.1)
PROT SERPL-MCNC: 7.4 G/DL (ref 6–8.4)
RBC # BLD AUTO: 5.37 M/UL (ref 4.6–6.2)
SODIUM SERPL-SCNC: 140 MMOL/L (ref 136–145)
TROPONIN I SERPL DL<=0.01 NG/ML-MCNC: <0.03 NG/ML (ref 0.02–0.04)
TROPONIN I SERPL DL<=0.01 NG/ML-MCNC: <0.03 NG/ML (ref 0.02–0.04)
WBC # BLD AUTO: 7.83 K/UL (ref 3.9–12.7)

## 2019-08-21 PROCEDURE — 85025 COMPLETE CBC W/AUTO DIFF WBC: CPT

## 2019-08-21 PROCEDURE — 93005 ELECTROCARDIOGRAM TRACING: CPT

## 2019-08-21 PROCEDURE — 36415 COLL VENOUS BLD VENIPUNCTURE: CPT

## 2019-08-21 PROCEDURE — 99285 EMERGENCY DEPT VISIT HI MDM: CPT | Mod: 25

## 2019-08-21 PROCEDURE — 85379 FIBRIN DEGRADATION QUANT: CPT

## 2019-08-21 PROCEDURE — 25000003 PHARM REV CODE 250: Performed by: STUDENT IN AN ORGANIZED HEALTH CARE EDUCATION/TRAINING PROGRAM

## 2019-08-21 PROCEDURE — 83880 ASSAY OF NATRIURETIC PEPTIDE: CPT

## 2019-08-21 PROCEDURE — 80053 COMPREHEN METABOLIC PANEL: CPT

## 2019-08-21 PROCEDURE — 84484 ASSAY OF TROPONIN QUANT: CPT | Mod: 91

## 2019-08-21 RX ORDER — ASPIRIN 325 MG
325 TABLET ORAL
Status: COMPLETED | OUTPATIENT
Start: 2019-08-21 | End: 2019-08-21

## 2019-08-21 RX ADMIN — ASPIRIN 325 MG ORAL TABLET 325 MG: 325 PILL ORAL at 03:08

## 2019-08-21 NOTE — ED PROVIDER NOTES
Encounter Date: 8/21/2019       History     Chief Complaint   Patient presents with    Chest Pain     right sided chest pain with some SOB about 2 hours PTA      HPI     44-year-old male with no significant past medical history presents with midsternal chest pain that started 2 hr prior to ED arrival.  Patient describes the pain as a pressure-like sensation that does not radiate and he rated an 8/10 at this time.  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  He also endorses associated shortness of breath.  However he states the chest pain and his shortness of breath are now improving.  He states he had a similar episode of chest pain twice in the past 2 months.  He denies taking anything at home for her symptoms. Denies drugs alcohol or cigarette use.  Patient also denies any significant family history of cardiac events.  No PE risk factors.     Patient does endorses left lower leg and ankle swelling that he has had since he had his achilles tendon repaired a year ago. He states the swelling has improved.     Review of patient's allergies indicates:  No Known Allergies  Past Medical History:   Diagnosis Date    Asthma     as a child     Past Surgical History:   Procedure Laterality Date    EXCISION, EXOSTOSIS, RETROCALCANEAL Right 10/26/2018    Performed by [facility] at [facility]    REPAIR, TENDON, ACHILLES Right 10/26/2018    Performed by Saurav Yang MD at [facility] OR     No family history on file.  Social History     Tobacco Use    Smoking status: Never Smoker    Smokeless tobacco: Never Used   Substance Use Topics    Alcohol use: Yes     Comment: rarely    Drug use: No     Review of Systems   Constitutional: Negative for chills and fever.   HENT: Negative for drooling.    Eyes: Negative for visual disturbance.   Respiratory: Positive for shortness of breath. Negative for cough.    Cardiovascular: Positive for chest pain and leg swelling (Unchanged from baseline).    Gastrointestinal: Negative for abdominal pain, nausea and vomiting.   Genitourinary: Negative for dysuria.   Musculoskeletal: Negative for back pain and neck pain.   Skin: Negative for color change and rash.   Neurological: Negative for syncope, weakness, light-headedness and headaches.   Psychiatric/Behavioral: Negative for confusion.       Physical Exam     Initial Vitals [08/21/19 1349]   BP Pulse Resp Temp SpO2   (!) 147/67 90 16 98.8 °F (37.1 °C) 95 %      MAP       --         Physical Exam    Nursing note and vitals reviewed.  Constitutional: He is not diaphoretic. No distress.   HENT:   Head: Normocephalic and atraumatic.   Eyes: Conjunctivae and EOM are normal. Pupils are equal, round, and reactive to light. No scleral icterus.   Neck: Normal range of motion. Neck supple. No JVD present.   Cardiovascular: Normal rate, regular rhythm, normal heart sounds and intact distal pulses. Exam reveals no gallop and no friction rub.    No murmur heard.  Pulmonary/Chest: Breath sounds normal. No respiratory distress. He has no wheezes. He has no rhonchi. He has no rales. He exhibits no tenderness.   Abdominal: Soft. He exhibits no distension and no mass. There is no tenderness. There is no rebound and no guarding.   Musculoskeletal: Normal range of motion. He exhibits edema (2+, left leg and ankle, non-pitting ). He exhibits no tenderness.   Neurological: He is alert.   Skin: Skin is warm and dry. No rash noted. No erythema. No pallor.       HO-IV MDM  This is an emergent evaluation of a 44-year-old male with no significant past medical history presents with midsternal chest pain that is pressure-like, nonradiating that started 2 hr prior to ED arrival. Vitals unremarkable. Physical exam notable for nontoxic-appearing male in no acute distress. Heart RRR, no murmurs, gallops or rubs. Lungs CTAB, no wheeze, rales or rhonchi.  No JVD.  There is 2+ nonpitting edema to the left leg and ankle.  The remainder of exam  benign.  DDx includes but is not limited to anxiety. We also consider however clinically less likely to be PE, ACS, pneumothorax, pneumonia, CHF, aortic dissection, pericardial effusion, pleural effusion.  Cardiac workup including CBC, CMP, troponin, BMP, coags , EKG obtained while in triage.  EKG shows normal sinus rhythm with a rate of 80 beats per minute, normal axis and intervals, what appears to be biphasic T-waves in leads 2,3 and AVF.  No old EKG in systems to compare.  We will also obtain chest x-ray and a D-dimer given patient has lower extremity swelling in the setting of chest pain or shortness of breath and is otherwise low risk for PE.  Will also provide aspirin 325 mg p.o. and pain medication as need. Will continue to monitor and frequently reassess pending results of labs, treatments and final disposition. Case discussed with Dr. Plunkett.       Patient is aware of plan and is amenable.     Aimee Buck D.O  [facility] EMERGENCY MEDICINE PGY-4  2:44 PM 08/21/2019      HO-IV UPDATE  Patient reassessed and states that his symptoms are moderately improved.  He states he has been under some stress specifically while at work.  Workup unremarkable including a -2 sets of troponin and D-dimer.  Given history and unremarkable workup in setting of exam, I feel patient's symptoms are likely due to anxiety. Will give instructions to follow up with his primary care physician in 2 days and also with cardiology.  Patient given ED return precautions and is agreeable to plan at this time.    Aimee Buck D.O  [facility] EMERGENCY MEDICINE PGY-4  5:27 PM 08/21/2019      ED Course   Procedures  Labs Reviewed   CBC W/ AUTO DIFFERENTIAL - Abnormal; Notable for the following components:       Result Value    Platelets 387 (*)     All other components within normal limits   COMPREHENSIVE METABOLIC PANEL   TROPONIN I   B-TYPE NATRIURETIC PEPTIDE        ECG Results          EKG 12-lead (In process)  Result time 08/21/19  13:59:23    In process by Interface, Lab In [facility] (08/21/19 13:59:23)                 Narrative:    Test Reason : R07.9,    Vent. Rate : 088 BPM     Atrial Rate : 088 BPM     P-R Int : 140 ms          QRS Dur : 084 ms      QT Int : 360 ms       P-R-T Axes : 057 -22 032 degrees     QTc Int : 435 ms    Normal sinus rhythm  Moderate voltage criteria for LVH, may be normal variant  Nonspecific T wave abnormality  Abnormal ECG  No previous ECGs available    Referred By:             Confirmed By:                             Imaging Results    None                            ED Course as of Aug 21 1617   Wed Aug 21, 2019   1546 Patient with chest tightness earlier today which now resolved.  Patient symptoms sound consistent with anxiety however given this presentation screening cardiac workup done.  Two sets of enzymes to be done.  If negative patient can be discharged home.  D-dimer is pending at this time.  Patient to follow up with cardiologist as outpatient for further management    [UM]      ED Course User Index  [UM] Dilip Plunkett MD     Clinical Impression:       ICD-10-CM ICD-9-CM   1. Anxiety F41.9 300.00   2. Chest pain R07.9 786.50                                Aimee Buck, DO  Resident  08/21/19 [id number]

## 2020-02-05 ENCOUNTER — HOSPITAL ENCOUNTER (EMERGENCY)
Facility: HOSPITAL | Age: 45
Discharge: HOME | End: 2020-02-05
Attending: EMERGENCY MEDICINE
Payer: COMMERCIAL

## 2020-02-05 ENCOUNTER — OFFICE VISIT (OUTPATIENT)
Dept: ORTHOPEDICS | Facility: CLINIC | Age: 45
End: 2020-02-05
Payer: COMMERCIAL

## 2020-02-05 VITALS
DIASTOLIC BLOOD PRESSURE: 56 MMHG | BODY MASS INDEX: 29.8 KG/M2 | WEIGHT: 220 LBS | HEIGHT: 72 IN | SYSTOLIC BLOOD PRESSURE: 115 MMHG | HEART RATE: 78 BPM

## 2020-02-05 VITALS
HEIGHT: 72 IN | DIASTOLIC BLOOD PRESSURE: 79 MMHG | RESPIRATION RATE: 20 BRPM | OXYGEN SATURATION: 99 % | BODY MASS INDEX: 29.8 KG/M2 | WEIGHT: 220 LBS | SYSTOLIC BLOOD PRESSURE: 132 MMHG | HEART RATE: 80 BPM | TEMPERATURE: 98 F

## 2020-02-05 DIAGNOSIS — M25.562 LEFT KNEE PAIN: ICD-10-CM

## 2020-02-05 DIAGNOSIS — M25.562 ACUTE PAIN OF LEFT KNEE: Primary | ICD-10-CM

## 2020-02-05 DIAGNOSIS — M23.8X2 DERANGEMENT OF LEFT KNEE LIGAMENT: Primary | ICD-10-CM

## 2020-02-05 DIAGNOSIS — S83.8X2A SPRAIN OF OTHER LIGAMENT OF LEFT KNEE, INITIAL ENCOUNTER: ICD-10-CM

## 2020-02-05 DIAGNOSIS — S89.92XA LEFT KNEE INJURY, INITIAL ENCOUNTER: Primary | ICD-10-CM

## 2020-02-05 DIAGNOSIS — M25.562 ACUTE PAIN OF LEFT KNEE: ICD-10-CM

## 2020-02-05 PROCEDURE — 99999 PR PBB SHADOW E&M-EST. PATIENT-LVL III: ICD-10-PCS | Mod: PBBFAC,,, | Performed by: ORTHOPAEDIC SURGERY

## 2020-02-05 PROCEDURE — 99999 PR PBB SHADOW E&M-EST. PATIENT-LVL III: CPT | Mod: PBBFAC,,, | Performed by: ORTHOPAEDIC SURGERY

## 2020-02-05 PROCEDURE — 99203 OFFICE O/P NEW LOW 30 MIN: CPT | Mod: S$GLB,,, | Performed by: ORTHOPAEDIC SURGERY

## 2020-02-05 PROCEDURE — 29505 APPLICATION LONG LEG SPLINT: CPT | Mod: LT

## 2020-02-05 PROCEDURE — 99284 EMERGENCY DEPT VISIT MOD MDM: CPT | Mod: 25

## 2020-02-05 PROCEDURE — 63600175 PHARM REV CODE 636 W HCPCS: Performed by: EMERGENCY MEDICINE

## 2020-02-05 PROCEDURE — 99203 PR OFFICE/OUTPT VISIT, NEW, LEVL III, 30-44 MIN: ICD-10-PCS | Mod: S$GLB,,, | Performed by: ORTHOPAEDIC SURGERY

## 2020-02-05 PROCEDURE — 96372 THER/PROPH/DIAG INJ SC/IM: CPT | Mod: 59

## 2020-02-05 RX ORDER — IBUPROFEN 800 MG/1
800 TABLET ORAL 3 TIMES DAILY
Qty: 60 TABLET | Refills: 0 | Status: SHIPPED | OUTPATIENT
Start: 2020-02-05

## 2020-02-05 RX ORDER — IBUPROFEN 800 MG/1
800 TABLET ORAL EVERY 6 HOURS PRN
Qty: 20 TABLET | Refills: 0 | Status: SHIPPED | OUTPATIENT
Start: 2020-02-05 | End: 2020-02-05 | Stop reason: SDUPTHER

## 2020-02-05 RX ORDER — KETOROLAC TROMETHAMINE 30 MG/ML
15 INJECTION, SOLUTION INTRAMUSCULAR; INTRAVENOUS
Status: COMPLETED | OUTPATIENT
Start: 2020-02-05 | End: 2020-02-05

## 2020-02-05 RX ADMIN — KETOROLAC TROMETHAMINE 15 MG: 30 INJECTION, SOLUTION INTRAMUSCULAR at 07:02

## 2020-02-05 NOTE — ED PROVIDER NOTES
Encounter Date: 2/5/2020       History     Chief Complaint   Patient presents with    Knee Pain     playing basketball     44-year-old male presents the emergency room for evaluation of left knee pain.  The patient was playing basketball yesterday evening when he caught pass rotated and felt a pop in his left knee.  He has pain to the left left knee medial surface.  He is having difficulty walking and has a painful gait.  He has swelling in the left knee.  There is no pain in the posterior knee.  He has no other extremity pain.        Review of patient's allergies indicates:  No Known Allergies  Past Medical History:   Diagnosis Date    Asthma     as a child     Past Surgical History:   Procedure Laterality Date    REPAIR OF ACHILLES TENDON Right 10/26/2018    Procedure: REPAIR, TENDON, ACHILLES;  Surgeon: Saurav Yang MD;  Location: [facility] OR;  Service: Orthopedics;  Laterality: Right;    SURGICAL REMOVAL OF RETROCALCANEAL EXOSTOSIS Right 10/26/2018    Procedure: EXCISION, EXOSTOSIS, RETROCALCANEAL;  Surgeon: Saurav Yang MD;  Location: [facility] OR;  Service: Orthopedics;  Laterality: Right;     No family history on file.  Social History     Tobacco Use    Smoking status: Never Smoker    Smokeless tobacco: Never Used   Substance Use Topics    Alcohol use: Yes     Comment: rarely    Drug use: No     Review of Systems   Constitutional: Negative for fever.   Musculoskeletal: Positive for arthralgias and gait problem. Negative for back pain.   Skin: Negative for color change, rash and wound.   Neurological: Negative for tremors, weakness, light-headedness and numbness.       Physical Exam     Initial Vitals [02/05/20 0607]   BP Pulse Resp Temp SpO2   132/79 80 20 97.9 °F (36.6 °C) 99 %      MAP       --         Physical Exam    Nursing note and vitals reviewed.  Constitutional: He appears well-developed and well-nourished. No distress.   HENT:   Head: Normocephalic and atraumatic.   Musculoskeletal:    Tenderness over the medial border of the left knee joint.  Patient has pain with lateral compression of the knee over the MCL.  Negative anterior-posterior drawer.  Mild left knee effusion.  Able to flex to 30° then limited by pain. Able to fully extend   Neurological: He is alert and oriented to person, place, and time. He has normal strength. No sensory deficit. GCS score is 15. GCS eye subscore is 4. GCS verbal subscore is 5. GCS motor subscore is 6.   Skin: Skin is warm and dry. No rash noted.         ED Course   Procedures  Labs Reviewed - No data to display       Imaging Results          X-Ray Knee 3 View Left (In process)                  Medical Decision Making:   Initial Assessment:   Left knee pain likely ligamentous in nature.  Doubt this is a fracture dislocation.  ED Management:  Knee immobilizer crutches and follow-up to Orthopedics.  X-ray of the knee shows no acute fracture dislocation or subluxation.  Will refer to orthopedics.  No neurovascular deficit.  Stable for discharge. Return precautions given                   ED Course as of Feb 05 0649   Wed Feb 05, 2020   0648 Knee x-ray shows no acute fracture dislocation or subluxation.    [JS]      ED Course User Index  [JS] Ralph Mejia MD                Clinical Impression:       ICD-10-CM ICD-9-CM   1. Derangement of left knee ligament M23.8X2 717.9   2. Left knee pain M25.562 719.46   3. Sprain of other ligament of left knee, initial encounter S83.8X2A 844.8                             Ralph Mejia MD  02/05/20 0649

## 2020-02-05 NOTE — PROGRESS NOTES
Past Medical History:   Diagnosis Date    Asthma     as a child       Past Surgical History:   Procedure Laterality Date    REPAIR OF ACHILLES TENDON Right 10/26/2018    Procedure: REPAIR, TENDON, ACHILLES;  Surgeon: Saurav Yang MD;  Location: [facility] OR;  Service: Orthopedics;  Laterality: Right;    SURGICAL REMOVAL OF RETROCALCANEAL EXOSTOSIS Right 10/26/2018    Procedure: EXCISION, EXOSTOSIS, RETROCALCANEAL;  Surgeon: Saurav Yang MD;  Location: [facility] OR;  Service: Orthopedics;  Laterality: Right;       Current Outpatient Medications   Medication Sig    ibuprofen (ADVIL,MOTRIN) 800 MG tablet Take 1 tablet (800 mg total) by mouth 3 (three) times daily.     No current facility-administered medications for this visit.        Review of patient's allergies indicates:  No Known Allergies    History reviewed. No pertinent family history.    Social History     Socioeconomic History    Marital status:      Spouse name: Not on file    Number of children: Not on file    Years of education: Not on file    Highest education level: Not on file   Occupational History    Not on file   Social Needs    Financial resource strain: Not on file    Food insecurity:     Worry: Not on file     Inability: Not on file    Transportation needs:     Medical: Not on file     Non-medical: Not on file   Tobacco Use    Smoking status: Never Smoker    Smokeless tobacco: Never Used   Substance and Sexual Activity    Alcohol use: Yes     Comment: rarely    Drug use: No    Sexual activity: Not on file   Lifestyle    Physical activity:     Days per week: Not on file     Minutes per session: Not on file    Stress: Not on file   Relationships    Social connections:     Talks on phone: Not on file     Gets together: Not on file     Attends [employer] service: Not on file     Active member of club or organization: Not on file     Attends meetings of clubs or organizations: Not on file     Relationship status: Not on file    Other Topics Concern    Not on file   Social History Narrative    Not on file       Chief Complaint:   Chief Complaint   Patient presents with    Left Knee - Pain       Consulting Physician: Self, Aaareferral    History of present illness:    This is a 44 y.o. year old male who complains of left knee pain since 02/04/2020.  He states he was playing basketball when he felt a pop in his knee. He had immediate pain and swelling. He went home a that night and the pain and swelling got worse.  He was seen in the ER where he was immobilized and given anti-inflammatories.  Since the injection puts his pain right now at a 0/10 but states it was much more significant prior.    Review of Systems:    Constitution:   Denies chills, fever, and sweats.  HENT:   Denies headaches or blurry vision.  Cardiovascular:  Denies chest pain or irregular heart beat.  Respiratory:   Denies cough or shortness of breath.  Gastrointestinal:  Denies abdominal pain, nausea, or vomiting.  Musculoskeletal:   Denies muscle cramps.  Neurological:   Denies dizziness or focal weakness.  Psychiatric/Behavior: Normal mental status.  Hematology/Lymph:  Denies bleeding problem or easy bruising/bleeding.  Skin:    Denies rash or suspicious lesions.    Examination:    Vital Signs:    Vitals:    02/05/20 1000   BP: (!) 115/56   Pulse: 78   Weight: 99.8 kg (220 lb 0.3 oz)   Height: 6' (1.829 m)   PainSc: 0-No pain       Body mass index is 29.84 kg/m².    Constitution:   Well-developed, well nourished patient in no acute distress.  Neurological:   Alert and oriented x 3 and cooperative to examination.     Psychiatric/Behavior: Normal mental status.  Respiratory:   No shortness of breath.  Eyes:    Extraoccular muscles intact  Skin:    No scars, rash or suspicious lesions.    Physical Exam: Left Knee  Exam    Gait   Normal    Skin  Rash:   None  Scars:   None    Inspection  Erythema:  None  Bruising:  None  Effusion:  Mild  Masses:  None  Lymphadenopathy: None    Range of Motion: 0 to 130° with pain    Medial Joint : Yes  Lateral Joint : No    Patellofemoral Tenderness: None  Patellofemoral Crepitus: None    Lachman:  Normal  Anterior Drawer: Normal  Posterior Drawer: Normal    Deion's:  Positive  Apley's:  Positive    Varus Stress:  Stable  Valgus Stress:  Stable    Strength:  5/5    Pulses:  Palpable distal    Sensation:  Intact          Imaging: X-rays ordered and images interpreted today personally by me of left knee appear normal.        Assessment: Acute pain of left knee        Plan:  Does not appear to have any ligamentous instability however he has exquisite medial joint line tenderness.  We will go ahead and place him into a brace today and obtain an MRI.      DISCLAIMER: This note may have been dictated using voice recognition software and may contain grammatical errors.     NOTE: Consult report sent to referring provider via [facility] [employer].

## 2020-02-08 ENCOUNTER — HOSPITAL ENCOUNTER (OUTPATIENT)
Dept: RADIOLOGY | Facility: HOSPITAL | Age: 45
Discharge: HOME | End: 2020-02-08
Attending: ORTHOPAEDIC SURGERY
Payer: COMMERCIAL

## 2020-02-08 DIAGNOSIS — M25.562 ACUTE PAIN OF LEFT KNEE: ICD-10-CM

## 2020-02-08 DIAGNOSIS — S89.92XA LEFT KNEE INJURY, INITIAL ENCOUNTER: ICD-10-CM

## 2020-02-08 PROCEDURE — 73721 MRI JNT OF LWR EXTRE W/O DYE: CPT | Mod: 26,LT,, | Performed by: RADIOLOGY

## 2020-02-08 PROCEDURE — 73721 MRI KNEE WITHOUT CONTRAST LEFT: ICD-10-PCS | Mod: 26,LT,, | Performed by: RADIOLOGY

## 2020-02-08 PROCEDURE — 73721 MRI JNT OF LWR EXTRE W/O DYE: CPT | Mod: TC,LT

## 2020-02-10 ENCOUNTER — OFFICE VISIT (OUTPATIENT)
Dept: ORTHOPEDICS | Facility: CLINIC | Age: 45
End: 2020-02-10
Payer: COMMERCIAL

## 2020-02-10 VITALS — BODY MASS INDEX: 29.8 KG/M2 | HEIGHT: 72 IN | WEIGHT: 220 LBS | RESPIRATION RATE: 16 BRPM

## 2020-02-10 DIAGNOSIS — M25.562 ACUTE PAIN OF LEFT KNEE: Primary | ICD-10-CM

## 2020-02-10 PROCEDURE — 99999 PR PBB SHADOW E&M-EST. PATIENT-LVL III: ICD-10-PCS | Mod: PBBFAC,,, | Performed by: ORTHOPAEDIC SURGERY

## 2020-02-10 PROCEDURE — 99999 PR PBB SHADOW E&M-EST. PATIENT-LVL III: CPT | Mod: PBBFAC,,, | Performed by: ORTHOPAEDIC SURGERY

## 2020-02-10 PROCEDURE — 99213 OFFICE O/P EST LOW 20 MIN: CPT | Mod: S$GLB,,, | Performed by: ORTHOPAEDIC SURGERY

## 2020-02-10 PROCEDURE — 99213 PR OFFICE/OUTPT VISIT, EST, LEVL III, 20-29 MIN: ICD-10-PCS | Mod: S$GLB,,, | Performed by: ORTHOPAEDIC SURGERY

## 2020-02-10 NOTE — PROGRESS NOTES
Past Medical History:   Diagnosis Date    Asthma     as a child       Past Surgical History:   Procedure Laterality Date    REPAIR OF ACHILLES TENDON Right 10/26/2018    Procedure: REPAIR, TENDON, ACHILLES;  Surgeon: Saurav Yang MD;  Location: [facility] OR;  Service: Orthopedics;  Laterality: Right;    SURGICAL REMOVAL OF RETROCALCANEAL EXOSTOSIS Right 10/26/2018    Procedure: EXCISION, EXOSTOSIS, RETROCALCANEAL;  Surgeon: Saurav Yang MD;  Location: [facility] OR;  Service: Orthopedics;  Laterality: Right;       Current Outpatient Medications   Medication Sig    ibuprofen (ADVIL,MOTRIN) 800 MG tablet Take 1 tablet (800 mg total) by mouth 3 (three) times daily.     No current facility-administered medications for this visit.        Review of patient's allergies indicates:  No Known Allergies    History reviewed. No pertinent family history.    Social History     Socioeconomic History    Marital status:      Spouse name: Not on file    Number of children: Not on file    Years of education: Not on file    Highest education level: Not on file   Occupational History    Not on file   Social Needs    Financial resource strain: Not on file    Food insecurity:     Worry: Not on file     Inability: Not on file    Transportation needs:     Medical: Not on file     Non-medical: Not on file   Tobacco Use    Smoking status: Never Smoker    Smokeless tobacco: Never Used   Substance and Sexual Activity    Alcohol use: Yes     Comment: rarely    Drug use: No    Sexual activity: Not on file   Lifestyle    Physical activity:     Days per week: Not on file     Minutes per session: Not on file    Stress: Not on file   Relationships    Social connections:     Talks on phone: Not on file     Gets together: Not on file     Attends [employer] service: Not on file     Active member of club or organization: Not on file     Attends meetings of clubs or organizations: Not on file     Relationship status: Not on file    Other Topics Concern    Not on file   Social History Narrative    Not on file       Chief Complaint:   Chief Complaint   Patient presents with    Left Knee - Pain, Injury       Consulting Physician: No ref. provider found    History of present illness:    This is a 44 y.o. year old male who complains of left knee pain since 02/04/2020.  He states he was playing basketball when he felt a pop in his knee. He had immediate pain and swelling. He went home a that night and the pain and swelling got worse.  He was seen in the ER where he was immobilized and given anti-inflammatories.  Since the injection puts his pain right now at a 0/10 but 8/10 with movement.    Review of Systems:    Constitution:   Denies chills, fever, and sweats.  HENT:   Denies headaches or blurry vision.  Cardiovascular:  Denies chest pain or irregular heart beat.  Respiratory:   Denies cough or shortness of breath.  Gastrointestinal:  Denies abdominal pain, nausea, or vomiting.  Musculoskeletal:   Denies muscle cramps.  Neurological:   Denies dizziness or focal weakness.  Psychiatric/Behavior: Normal mental status.  Hematology/Lymph:  Denies bleeding problem or easy bruising/bleeding.  Skin:    Denies rash or suspicious lesions.    Examination:    Vital Signs:    Vitals:    02/10/20 0956   Resp: 16   Weight: 99.8 kg (220 lb 0.3 oz)   Height: 6' (1.829 m)   PainSc: 0-No pain   PainLoc: Knee       Body mass index is 29.84 kg/m².    Constitution:   Well-developed, well nourished patient in no acute distress.  Neurological:   Alert and oriented x 3 and cooperative to examination.     Psychiatric/Behavior: Normal mental status.  Respiratory:   No shortness of breath.  Eyes:    Extraoccular muscles intact  Skin:    No scars, rash or suspicious lesions.    Physical Exam: Left Knee Exam    Gait   Normal    Skin  Rash:   None  Scars:   None    Inspection  Erythema:  None  Bruising:  None  Effusion:  Mild  Masses:  None  Lymphadenopathy: None    Range  of Motion: 0 to 130° with pain    Medial Joint : Yes  Lateral Joint : No    Patellofemoral Tenderness: None  Patellofemoral Crepitus: None    Lachman:  Normal  Anterior Drawer: Normal  Posterior Drawer: Normal    Deion's:  Positive  Apley's:  Positive    Varus Stress:  Stable  Valgus Stress:  Stable    Strength:  5/5    Pulses:  Palpable distal    Sensation:  Intact          Imaging: X-rays of left knee appear normal. The MRI study images that were interpreted personally by me today and reviewed with the patient demonstrate a high-grade tear or sprain of the medial collateral ligament.         Assessment: Acute pain of left knee        Plan:  Does not appear to have any ligamentous instability however he has exquisite medial joint line tenderness.  We will continue his brace and start him in physical therapy.  We discussed treatment options and explained that we usually do not have to operatively repair these injuries.  We will see him back in 4 weeks time.      DISCLAIMER: This note may have been dictated using voice recognition software and may contain grammatical errors.     NOTE: Consult report sent to referring provider via [facility].

## (undated) DEVICE — ALCOHOL 70% ISOP RUBBING 4OZ

## (undated) DEVICE — BLADE SURG #15 CARBON STEEL

## (undated) DEVICE — SEE MEDLINE ITEM 146308

## (undated) DEVICE — PAD CAST SPECIALIST STRL 4

## (undated) DEVICE — GAUZE SPONGE 8X4 12 PLY

## (undated) DEVICE — SUT MONOCRYL 0 CT-1 UND MON

## (undated) DEVICE — SPLINT PLASTER FS 5IN X 30IN

## (undated) DEVICE — DRESSING XEROFORM FOIL PK 1X8

## (undated) DEVICE — TAPE SUTU FIBERTAPE BLU 2 17IN

## (undated) DEVICE — TOURNIQUET SB QC DP 24X4IN

## (undated) DEVICE — SEE MEDLINE ITEM 152529

## (undated) DEVICE — SEE MEDLINE ITEM 157131

## (undated) DEVICE — SUT MONOCRYL 2-0 S UND

## (undated) DEVICE — SEE MEDLINE ITEM 146231

## (undated) DEVICE — Device

## (undated) DEVICE — PAD CAST SPECIALIST STRL 6

## (undated) DEVICE — PAD ABD 8X10 STERILE

## (undated) DEVICE — ELECTRODE REM PLYHSV RETURN 9

## (undated) DEVICE — SEE MEDLINE ITEM 157117

## (undated) DEVICE — BLADE MEDIUM LONG 9MM X 31MM

## (undated) DEVICE — DURAPREP SURG SCRUB 26ML

## (undated) DEVICE — DRAPE EXTREMITY W/ABC NON-SLIP

## (undated) DEVICE — SUT ETHILON 3-0 PS2 18 BLK

## (undated) DEVICE — DRAPE STERI U-SHAPED 47X51IN

## (undated) DEVICE — UNDERGLOVE BIOGEL PI SZ 6.5 LF